# Patient Record
Sex: FEMALE | Race: OTHER | NOT HISPANIC OR LATINO | URBAN - METROPOLITAN AREA
[De-identification: names, ages, dates, MRNs, and addresses within clinical notes are randomized per-mention and may not be internally consistent; named-entity substitution may affect disease eponyms.]

---

## 2019-09-30 ENCOUNTER — EMERGENCY (EMERGENCY)
Facility: HOSPITAL | Age: 39
LOS: 1 days | Discharge: HOME | End: 2019-09-30
Attending: EMERGENCY MEDICINE | Admitting: EMERGENCY MEDICINE
Payer: SELF-PAY

## 2019-09-30 VITALS
DIASTOLIC BLOOD PRESSURE: 78 MMHG | SYSTOLIC BLOOD PRESSURE: 124 MMHG | RESPIRATION RATE: 16 BRPM | OXYGEN SATURATION: 98 % | HEART RATE: 69 BPM

## 2019-09-30 VITALS
TEMPERATURE: 99 F | OXYGEN SATURATION: 98 % | DIASTOLIC BLOOD PRESSURE: 84 MMHG | RESPIRATION RATE: 18 BRPM | SYSTOLIC BLOOD PRESSURE: 137 MMHG | HEART RATE: 77 BPM

## 2019-09-30 PROCEDURE — 26720 TREAT FINGER FRACTURE EACH: CPT | Mod: 54,F2

## 2019-09-30 PROCEDURE — 73130 X-RAY EXAM OF HAND: CPT | Mod: 26,LT

## 2019-09-30 PROCEDURE — 99285 EMERGENCY DEPT VISIT HI MDM: CPT | Mod: 25

## 2019-09-30 PROCEDURE — 73200 CT UPPER EXTREMITY W/O DYE: CPT | Mod: 26,LT

## 2019-09-30 PROCEDURE — 73030 X-RAY EXAM OF SHOULDER: CPT | Mod: 26,LT

## 2019-09-30 RX ORDER — IBUPROFEN 200 MG
1 TABLET ORAL
Qty: 30 | Refills: 0
Start: 2019-09-30

## 2019-09-30 RX ORDER — MORPHINE SULFATE 50 MG/1
4 CAPSULE, EXTENDED RELEASE ORAL ONCE
Refills: 0 | Status: DISCONTINUED | OUTPATIENT
Start: 2019-09-30 | End: 2019-09-30

## 2019-09-30 RX ORDER — KETOROLAC TROMETHAMINE 30 MG/ML
30 SYRINGE (ML) INJECTION ONCE
Refills: 0 | Status: DISCONTINUED | OUTPATIENT
Start: 2019-09-30 | End: 2019-09-30

## 2019-09-30 RX ORDER — ACETAMINOPHEN 500 MG
0 TABLET ORAL
Qty: 0 | Refills: 0 | DISCHARGE

## 2019-09-30 RX ADMIN — MORPHINE SULFATE 4 MILLIGRAM(S): 50 CAPSULE, EXTENDED RELEASE ORAL at 16:54

## 2019-09-30 RX ADMIN — MORPHINE SULFATE 4 MILLIGRAM(S): 50 CAPSULE, EXTENDED RELEASE ORAL at 14:32

## 2019-09-30 RX ADMIN — Medication 30 MILLIGRAM(S): at 16:54

## 2019-09-30 NOTE — ED PROVIDER NOTE - CARE PROVIDER_API CALL
Kulwinder Geller)  Orthopaedic Surgery Surgery  [address] [place] [address]  Phone: [phone]  Fax: [phone]  Follow Up Time:

## 2019-09-30 NOTE — ED PROVIDER NOTE - DIAGNOSTIC INTERPRETATION
Interpreted by ED physician  _ x-ray, _ views  Lungs clear, heart shadow normal, bony structures normal, no free air under diaphragm, no PTX

## 2019-09-30 NOTE — ED PROVIDER NOTE - OBJECTIVE STATEMENT
37 y/o F, R hand dominant, presents to ED for L shoulder and L 3rd digit pain s/p mechanical fall. Pt states she was going up the stairs when she lost balance and fell down. + head trauma. Denies any loc or neck pain. Denies any hand, wrist, or elbow pain. Denies any N/V, headache, dizziness, numbness, tingling.

## 2019-09-30 NOTE — ED PROVIDER NOTE - PATIENT PORTAL LINK FT
You can access the FollowMyHealth Patient Portal offered by [facility] by registering at the following website: [email or web address]. By joining [employer]’s FollowMyHealth portal, you will also be able to view your health information using other applications (apps) compatible with our system.

## 2019-09-30 NOTE — ED PROVIDER NOTE - CLINICAL SUMMARY MEDICAL DECISION MAKING FREE TEXT BOX
37 y/o F 39 y/o F in ED w concern for left shoulder and middle finger pain s/p mechanical fall today.  Patient with negative x ray imaging of shoulder, though given pain out of proportion on exam, patient is sent for CT imaging and prelim report showing no fracture or dislocation.  Non displaced fracture to middle phalanx noted.  Splint placed and patient is given rx for motrin and referral information for orthopedic follow up.  Patient is advised to ice injuries and follow up with PCP and ortho in 1-2 days for re eval.  Patient will return to ED immediately should she have any concern prior to this recommended follow up.  Patient is aware of plan and verbalizes her understanding.  Will discharge home at this time.

## 2019-09-30 NOTE — ED PROVIDER NOTE - CHPI ED SYMPTOMS NEG
no vomiting/no neck pain, no nausea, no headache, no dizziness./no loss of consciousness/no numbness/no tingling

## 2019-09-30 NOTE — ED PROVIDER NOTE - PROGRESS NOTE DETAILS
Applied splint to middle finger for concern of proximal avulsion fracture. Pt endorsing worsening pain to L shoulder. Will obtain CT and reevaluate. Discussed x-ray w radiology and does not see any fracture or dislocation.

## 2019-09-30 NOTE — ED PROVIDER NOTE - MUSCULOSKELETAL, MLM
Anterior lateral shoulder TTP. Neuro vascular intact. Sensation and motor intact. No TTP to b/l clavicles. Mild TTP to distal L middle finger. No swelling, no skin break down, no ecchymosis. Remainder of hand, wrist, and elbow pain free. No midline cervical, thoracic or lumbar spine pain/tenderness/stepoff/deformity.  Anterior lateral left shoulder TTP. Neuro vascular intact. Sensation and motor intact. No TTP to b/l clavicles. Mild TTP to distal L middle finger. No swelling, no skin break down, no ecchymosis. Remainder of hand, wrist, and elbow pain free.

## 2019-09-30 NOTE — ED PROVIDER NOTE - CARE PLAN
Principal Discharge DX:	Contusion of left shoulder, initial encounter  Secondary Diagnosis:	Closed displaced fracture of proximal phalanx of left middle finger, initial encounter  Secondary Diagnosis:	Fall, initial encounter

## 2019-09-30 NOTE — ED PROVIDER NOTE - SECONDARY DIAGNOSIS.
Closed displaced fracture of proximal phalanx of left middle finger, initial encounter Fall, initial encounter

## 2019-09-30 NOTE — ED PROVIDER NOTE - NSFOLLOWUPINSTRUCTIONS_ED_ALL_ED_FT
Please follow up with orthopedist (information provided) as well as your primary physician in 1-2 days for re evaluation.  Please return to ER immediately should your symptoms worsen or if you have any concern prior to this recommended follow up.    Finger Fracture    WHAT YOU NEED TO KNOW:    A finger fracture is a break in 1 or more of the bones in your finger.     DISCHARGE INSTRUCTIONS:    Return to the emergency department if:     Your cast or splint gets wet, damaged, or comes off.      Your splint or cast feels too tight.      You have severe pain.      Your injured finger is numb, cold, or pale.    Contact your healthcare provider or hand specialist if:     Your pain or swelling gets worse, even after treatment.      You have questions or concerns about your condition or care.    Medicines:     NSAIDs, such as ibuprofen, help decrease swelling, pain, and fever. This medicine is available with or without a doctor's order. NSAIDs can cause stomach bleeding or kidney problems in certain people. If you take blood thinner medicine, always ask your healthcare provider if NSAIDs are safe for you. Always read the medicine label and follow directions.      Acetaminophen decreases pain and fever. It is available without a doctor's order. Ask how much to take and how often to take it. Follow directions. Acetaminophen can cause liver damage if not taken correctly.      Prescription pain medicine may be given. Ask your healthcare provider how to take this medicine safely. Some prescription pain medicines contain acetaminophen. Do not take other medicines that contain acetaminophen without talking to your healthcare provider. Too much acetaminophen may cause liver damage. Prescription pain medicine may cause constipation. Ask your healthcare provider how to prevent or treat constipation.       Take your medicine as directed. Contact your healthcare provider if you think your medicine is not helping or if you have side effects. Tell him or her if you are allergic to any medicine. Keep a list of the medicines, vitamins, and herbs you take. Include the amounts, and when and why you take them. Bring the list or the pill bottles to follow-up visits. Carry your medicine list with you in case of an emergency.    Self-care:     Wear your splint as directed. Do not remove your splint until you follow up with your healthcare provider or hand specialist.       Apply ice on your finger for 15 to 20 minutes every hour or as directed. Use an ice pack, or put crushed ice in a plastic bag. Cover it with a towel before you apply it to your skin. Ice helps prevent tissue damage and decreases swelling and pain.      Elevate your finger above the level of your heart as often as you can. This will help decrease swelling and pain. Prop your hand on pillows or blankets to keep it elevated comfortably.       Go to physical therapy as directed. A physical therapist teaches you exercises to help improve movement and strength, and to decrease pain.     Follow up with your healthcare provider or hand specialist within 2 days: Write down your questions so you remember to ask them during your visits.        © Copyright [employer] 2019 All illustrations and images included in CareNotes are the copyrighted property of [employer] or [employer].      back to top          Shoulder Sprain    WHAT YOU NEED TO KNOW:    A shoulder sprain happens when a ligament in your shoulder is stretched or torn. Ligaments are the tough tissues that connect bones. Ligaments allow you to lift, lower, and rotate your arm.Shoulder Anatomy         DISCHARGE INSTRUCTIONS:    Return to the emergency department if:     You feel severe pain in your shoulder when you move it, or it is touched.      Your skin feels cold or clammy.      You have numbness, tingling, or a feeling of pins and needles in your shoulder.       The skin on your injured shoulder looks blue or pale.    Contact your healthcare provider if:     You have new or increased swelling and pain in your shoulder.      You have new or increased stiffness when you move your injured shoulder.      Your symptoms do not improve within 5 to 7 days.       You have questions or concerns about your condition or care.    Medicines:     Acetaminophen decreases pain and fever. It is available without a doctor's order. Ask how much to take and how often to take it. Follow directions. Read the labels of all other medicines you are using to see if they also contain acetaminophen, or ask your doctor or pharmacist. Acetaminophen can cause liver damage if not taken correctly. Do not use more than 4 grams (4,000 milligrams) total of acetaminophen in one day.       NSAIDs, such as ibuprofen, help decrease swelling, pain, and fever. This medicine is available with or without a doctor's order. NSAIDs can cause stomach bleeding or kidney problems in certain people. If you take blood thinner medicine, always ask your healthcare provider if NSAIDs are safe for you. Always read the medicine label and follow directions.      Prescription pain medicine may be given. Ask your healthcare provider how to take this medicine safely. Some prescription pain medicines contain acetaminophen. Do not take other medicines that contain acetaminophen without talking to your healthcare provider. Too much acetaminophen may cause liver damage. Prescription pain medicine may cause constipation. Ask your healthcare provider how to prevent or treat constipation.       Take your medicine as directed. Contact your healthcare provider if you think your medicine is not helping or if you have side effects. Tell him or her if you are allergic to any medicine. Keep a list of the medicines, vitamins, and herbs you take. Include the amounts, and when and why you take them. Bring the list or the pill bottles to follow-up visits. Carry your medicine list with you in case of an emergency.    Follow up with your healthcare provider as directed: Write down your questions so you remember to ask them during your visits.     Self-care:     Rest your shoulder so it can heal. Avoid moving your shoulder as your injury heals. This will help decrease the risk of more damage to your shoulder.      Apply ice on your shoulder for 20 to 30 minutes every 2 hours or as directed. Use an ice pack, or put crushed ice in a plastic bag. Cover it with a towel before you apply it to your shoulder. Ice helps prevent tissue damage and decreases swelling and pain.      Compress your shoulder as directed. Compression provides support and helps decrease swelling and movement so your shoulder can heal. For mild sprains, you may be given a sling to support your arm. You may need a padded brace or a plaster cast to hold your shoulder in place if the sprain is more serious.    How to wear a brace, sling, or splint: A brace, sling, or splint may be needed to limit your movement and protect your injured shoulder.Shoulder Sling         Wear your brace, sling, or splint as directed. You may need to wear it all the time and take it off only to bathe or do exercises. Ask your healthcare provider how long you should wear it.      Keep your skin clean and dry. Padding under your armpit will help absorb sweat and prevent sores on your skin.      Do not hunch your shoulders. This may cause pain. Keep your shoulders relaxed.      Position the sling over your arm and hand so that it also covers your knuckles. This will help the sling support your wrist and hand. Position your wrist higher than your elbow. Your wrist may start to hurt or go numb if your sling is too short.     Physical therapy: A physical therapist teaches you exercises to help improve movement and strength, and to decrease pain.     Prevent another injury:     Do not exercise when you are tired or in pain. Warm up and stretch before you exercise.      Wear equipment to protect yourself when you play sports.      Wear shoes that fit well and run on flat surfaces to prevent falls.         © Copyright [employer] 2019 All illustrations and images included in CareNotes are the copyrighted property of [employer] or [employer].      back to top                      © Copyright [employer] 2019
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
## 2019-10-04 DIAGNOSIS — S62.613A DISPLACED FRACTURE OF PROXIMAL PHALANX OF LEFT MIDDLE FINGER, INITIAL ENCOUNTER FOR CLOSED FRACTURE: ICD-10-CM
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
## 2019-10-04 DIAGNOSIS — S40.012A CONTUSION OF LEFT SHOULDER, INITIAL ENCOUNTER: ICD-10-CM
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
## 2019-10-04 DIAGNOSIS — M25.512 PAIN IN LEFT SHOULDER: ICD-10-CM
